# Patient Record
Sex: FEMALE | Employment: UNEMPLOYED | URBAN - METROPOLITAN AREA
[De-identification: names, ages, dates, MRNs, and addresses within clinical notes are randomized per-mention and may not be internally consistent; named-entity substitution may affect disease eponyms.]

---

## 2019-11-04 ENCOUNTER — OFFICE VISIT (OUTPATIENT)
Dept: OTOLARYNGOLOGY | Facility: CLINIC | Age: 49
End: 2019-11-04
Payer: MEDICAID

## 2019-11-04 VITALS
BODY MASS INDEX: 41.02 KG/M2 | TEMPERATURE: 99 F | SYSTOLIC BLOOD PRESSURE: 116 MMHG | HEIGHT: 71 IN | DIASTOLIC BLOOD PRESSURE: 76 MMHG | WEIGHT: 293 LBS

## 2019-11-04 DIAGNOSIS — R05.9 COUGH: ICD-10-CM

## 2019-11-04 DIAGNOSIS — E04.1 THYROID NODULE: Primary | ICD-10-CM

## 2019-11-04 PROCEDURE — 99243 OFF/OP CNSLTJ NEW/EST LOW 30: CPT | Performed by: OTOLARYNGOLOGY

## 2019-11-04 RX ORDER — NAPROXEN 500 MG/1
TABLET ORAL
Refills: 1 | COMMUNITY
Start: 2019-07-05

## 2019-11-04 RX ORDER — LIDOCAINE HYDROCHLORIDE 20 MG/ML
SOLUTION ORAL; TOPICAL
Refills: 0 | COMMUNITY
Start: 2019-05-28

## 2019-11-04 RX ORDER — MAGNESIUM OXIDE 400 MG (241.3 MG MAGNESIUM) TABLET
1 TABLET NIGHTLY
COMMUNITY

## 2019-11-04 RX ORDER — ALBUTEROL SULFATE 90 UG/1
AEROSOL, METERED RESPIRATORY (INHALATION)
Refills: 3 | COMMUNITY
Start: 2019-10-14

## 2019-11-04 RX ORDER — MONTELUKAST SODIUM 10 MG/1
10 TABLET ORAL NIGHTLY
Qty: 30 TABLET | Refills: 3 | Status: SHIPPED | OUTPATIENT
Start: 2019-11-04

## 2019-11-04 RX ORDER — FLUTICASONE PROPIONATE 50 MCG
SPRAY, SUSPENSION (ML) NASAL
Refills: 6 | COMMUNITY
Start: 2019-10-14

## 2019-11-04 NOTE — PROGRESS NOTES
Ben Calvillo is a 54year old female. Patient presents with:  Thyroid Nodule: US done on 10-29-19 showed thyroid nodule      HISTORY OF PRESENT ILLNESS  She presents a long history of cough.   She had a chest x-ray performed due to this cough which de Past Medical History:   Diagnosis Date   • Anemia    • Osteoarthritis        Past Surgical History:   Procedure Laterality Date   • HIP SURGERY Left    • OTHER SURGICAL HISTORY      turbinate reduction          REVIEW OF SYSTEMS    System Neg/Pos Det Supraclavicular.         Nose/Mouth/Throat Normal External nose - Normal. Lips/teeth/gums - Normal. Tonsils - Normal. Oropharynx - Normal.   Nose/Mouth/Throat Normal Nares - Right: Normal Left: Normal. Septum -deviated to the left turbinates - Right: Normal slightly. I have asked her to use fluticasone twice a day Loratadine-D and Singulair and we will reevaluate her in a few weeks with laryngoscopy if she is no better.   We will get appropriate authorization from insurance to perform this in the office when

## 2019-11-25 NOTE — IMAGING NOTE
Left voice message for the patient, regarding thyroid biopsy, with arrival time,parking, LOS and also to hold all the blood thinners, including anti-inflammatory agents, including naproxen. Left call back number.

## 2019-11-29 ENCOUNTER — HOSPITAL ENCOUNTER (OUTPATIENT)
Dept: ULTRASOUND IMAGING | Facility: HOSPITAL | Age: 49
Discharge: HOME | End: 2019-11-29
Attending: OTOLARYNGOLOGY
Payer: MEDICAID

## 2019-11-29 VITALS
DIASTOLIC BLOOD PRESSURE: 67 MMHG | WEIGHT: 293 LBS | SYSTOLIC BLOOD PRESSURE: 118 MMHG | BODY MASS INDEX: 41.02 KG/M2 | RESPIRATION RATE: 15 BRPM | HEART RATE: 65 BPM | HEIGHT: 71 IN

## 2019-11-29 DIAGNOSIS — E04.1 THYROID NODULE: ICD-10-CM

## 2019-11-29 PROCEDURE — 88177 CYTP FNA EVAL EA ADDL: CPT | Performed by: OTOLARYNGOLOGY

## 2019-11-29 PROCEDURE — 88172 CYTP DX EVAL FNA 1ST EA SITE: CPT | Performed by: OTOLARYNGOLOGY

## 2019-11-29 PROCEDURE — 88173 CYTOPATH EVAL FNA REPORT: CPT | Performed by: OTOLARYNGOLOGY

## 2019-11-29 PROCEDURE — 10005 FNA BX W/US GDN 1ST LES: CPT | Performed by: OTOLARYNGOLOGY

## 2019-11-29 NOTE — IMAGING NOTE
1418 Pt arrived to ultrasound room #4     1425 Scans taken by Chas Johnson, ultrasound  sonographer     1426 History taken and as follows:  ABNORMAL US THYROID. FAMILY HX THYROID DISEASE.      1428 Procedure explained questions answered.     1429 Consent verifie

## 2019-12-04 ENCOUNTER — TELEPHONE (OUTPATIENT)
Dept: OTOLARYNGOLOGY | Facility: CLINIC | Age: 49
End: 2019-12-04

## 2019-12-04 NOTE — TELEPHONE ENCOUNTER
Reviewed Dr. Umm Andino comments in result note section with pt. FU appt booked for 12/6 in [facility].

## 2019-12-06 ENCOUNTER — OFFICE VISIT (OUTPATIENT)
Dept: OTOLARYNGOLOGY | Facility: CLINIC | Age: 49
End: 2019-12-06
Payer: MEDICAID

## 2019-12-06 ENCOUNTER — TELEPHONE (OUTPATIENT)
Dept: OTOLARYNGOLOGY | Facility: CLINIC | Age: 49
End: 2019-12-06

## 2019-12-06 VITALS
SYSTOLIC BLOOD PRESSURE: 130 MMHG | HEIGHT: 71 IN | DIASTOLIC BLOOD PRESSURE: 76 MMHG | TEMPERATURE: 98 F | WEIGHT: 293 LBS | BODY MASS INDEX: 41.02 KG/M2

## 2019-12-06 DIAGNOSIS — R07.0 THROAT PAIN IN ADULT: ICD-10-CM

## 2019-12-06 DIAGNOSIS — E04.1 THYROID NODULE: Primary | ICD-10-CM

## 2019-12-06 PROCEDURE — 99214 OFFICE O/P EST MOD 30 MIN: CPT | Performed by: OTOLARYNGOLOGY

## 2019-12-07 NOTE — PROGRESS NOTES
Prasad Andrews is a 54year old female. Patient presents with:  Results: US FNA right thyroid nodule done on 11-29-19      HISTORY OF PRESENT ILLNESS  She presents a long history of cough.   She had a chest x-ray performed due to this cough which type of malignant process in her throat. She denies reflux but does admit to some postnasal discharge and nasal congestion. No other signs, symptoms or complaints with no associated voice changes ear pain or difficulty swallowing food.       Social Histor Normal. Thyroid gland - Normal.   Eyes Normal Conjunctiva - Right: Normal, Left: Normal. Pupil - Right: Normal, Left: Normal. Fundus - Right: Normal, Left: Normal.   Neurological Normal Memory - Normal. Cranial nerves - Cranial nerves II through XII grossl centimeter in size. 2. Throat pain in adult  Throat pain may be secondary to chronic postnasal discharge. I have asked her to continue fluticasone and to start Loratadine-D and Singulair.   Return to see me in 1 month and will perform laryngoscopy in th

## 2019-12-09 NOTE — TELEPHONE ENCOUNTER
Rn tried to contact Nicolas Lyle from [facility] ext [phone] to inform her that we will get the prior auth  1-2 weeks prior to pt appointment on 1/10/2020.

## 2019-12-20 ENCOUNTER — TELEPHONE (OUTPATIENT)
Dept: OTOLARYNGOLOGY | Facility: CLINIC | Age: 49
End: 2019-12-20

## 2019-12-20 NOTE — TELEPHONE ENCOUNTER
Rn tried to get prior auth for laryngoscopy spoke to [employer] ref#[id number] an 12/21/19 and was advised to call back 7 days prior to appointment date.

## 2019-12-30 ENCOUNTER — TELEPHONE (OUTPATIENT)
Dept: OTOLARYNGOLOGY | Facility: CLINIC | Age: 49
End: 2019-12-30

## 2019-12-30 NOTE — TELEPHONE ENCOUNTER
Prior auth for laryngoscope will be done in the office still in process thru [employer]ove[employer] case #[id number] and clinicals were faxed to #[phone].

## 2020-01-02 NOTE — TELEPHONE ENCOUNTER
LMTCB to inform pt prior authorization for laryngoscopy  A [id number] 1 visit valid from 12/30/19 thru 2/9/20 as pt had scheduled appt on 1/10/20 ,please advise pt pt to check insurance for benefits and out of pocket costs.

## 2020-01-10 ENCOUNTER — OFFICE VISIT (OUTPATIENT)
Dept: OTOLARYNGOLOGY | Facility: CLINIC | Age: 50
End: 2020-01-10
Payer: MEDICAID

## 2020-01-10 VITALS
HEIGHT: 71 IN | DIASTOLIC BLOOD PRESSURE: 82 MMHG | TEMPERATURE: 98 F | SYSTOLIC BLOOD PRESSURE: 132 MMHG | WEIGHT: 293 LBS | BODY MASS INDEX: 41.02 KG/M2

## 2020-01-10 DIAGNOSIS — R07.0 THROAT PAIN IN ADULT: Primary | ICD-10-CM

## 2020-01-10 PROCEDURE — 99213 OFFICE O/P EST LOW 20 MIN: CPT | Performed by: OTOLARYNGOLOGY

## 2020-01-10 RX ORDER — METHSCOPOLAMINE BROMIDE 2.5 MG/1
2.5 TABLET ORAL 2 TIMES DAILY
Qty: 60 TABLET | Refills: 3 | Status: SHIPPED | OUTPATIENT
Start: 2020-01-10

## 2020-01-10 RX ORDER — AMOXICILLIN AND CLAVULANATE POTASSIUM 875; 125 MG/1; MG/1
1 TABLET, FILM COATED ORAL EVERY 12 HOURS
Qty: 20 TABLET | Refills: 0 | Status: SHIPPED | OUTPATIENT
Start: 2020-01-10

## 2020-01-10 NOTE — PROGRESS NOTES
Jeancarlos Marquez is a 54year old female. Patient presents with:   Follow - Up: regarding throat pain, no change in symptoms since last visit       HISTORY OF PRESENT ILLNESS  She presents a long history of cough.  She had a chest x-ray performed d that she may have some type of malignant process in her throat. She denies reflux but does admit to some postnasal discharge and nasal congestion.   No other signs, symptoms or complaints with no associated voice changes ear pain or difficulty swallowing f intolerance and heat intolerance. Neuro Negative Tremors. Psych Negative Anxiety and depression. Integumentary Negative Frequent skin infections, pigment change and rash. Hema/Lymph Negative Easy bleeding and easy bruising.            PHYSICAL EXAM into the nose. Laryngoscopy:  Flexible Fiberoptic Laryngoscopy: A diagnostic flexible fiberoptic laryngoscopy was performed. The flexible fiberoptic laryngoscope was placed into the nose or mouthand advanced  into the interior of the larynx.  A thorough the Singulair loratadine D and Astelin nasal spray and will add Augmentin for what appears to be an acute sinus infection and methscopolamine for her chronic drainage. Return to see me in 1 month for further evaluation.   Endoscopy reveals no lesions or ma

## 2020-01-14 ENCOUNTER — TELEPHONE (OUTPATIENT)
Dept: OTOLARYNGOLOGY | Facility: CLINIC | Age: 50
End: 2020-01-14

## 2020-01-14 NOTE — TELEPHONE ENCOUNTER
•  Methscopolamine Bromide 2.5 MG Oral Tab, Take 1 tablet (2.5 mg total) by mouth 2 (two) times daily. , Disp: 60 tablet, Rfl: 3    Fax received from [employer] plan does not cover medication prescribed/ please fax back with margaat

## 2020-01-15 NOTE — TELEPHONE ENCOUNTER
Medication PA Requested:  Methscopolamine Bromide 2.5 mg twice daily                                                           Insurance ID# and group:[id number]  Dx.:throat pain  Rn faxed prior auth form To [employer] #[phone],RG will 24 ho

## 2020-01-23 NOTE — TELEPHONE ENCOUNTER
Dr Pedraza Blazing patient prior auth for Methscopolamine was denied and advised pt to try glycopyrrolate,please advise.

## 2020-01-24 RX ORDER — HYOSCYAMINE SULFATE 0.125 MG
125 TABLET ORAL 2 TIMES DAILY
Qty: 60 TABLET | Refills: 1 | OUTPATIENT
Start: 2020-01-24

## 2024-04-16 ENCOUNTER — OFFICE VISIT (OUTPATIENT)
Dept: SURGERY | Facility: CLINIC | Age: 54
End: 2024-04-16
Payer: MEDICAID

## 2024-04-16 DIAGNOSIS — R59.1 LYMPHADENOPATHY: Primary | ICD-10-CM

## 2024-04-16 PROCEDURE — 99203 OFFICE O/P NEW LOW 30 MIN: CPT | Performed by: SURGERY

## 2024-04-16 NOTE — H&P
History and Physical      HPI       HPI  Megan Almonte is a 53 year old female who presents with painful lymphadenopathy.  CT scan demonstrates intra-abdominal as well as axillary lymphadenopathy.  These are tender to palpation.  She is sent for excisional biopsy.  She is also undergoing a neurologic workup at [facility] for sciatica and upper extremity weakness    Past Medical History:    Anemia    Osteoarthritis     Past Surgical History:   Procedure Laterality Date    Hip surgery Left     Other surgical history      turbinate reduction      Current Outpatient Medications   Medication Sig Dispense Refill    Hyoscyamine Sulfate 0.125 MG Oral Tab Take 1 tablet (125 mcg total) by mouth 2 (two) times daily. 60 tablet 1    Sertraline HCl 50 MG Oral Tab TK 1 T PO QD      Methscopolamine Bromide 2.5 MG Oral Tab Take 1 tablet (2.5 mg total) by mouth 2 (two) times daily. 60 tablet 3    Amoxicillin-Pot Clavulanate 875-125 MG Oral Tab Take 1 tablet by mouth every 12 (twelve) hours. 20 tablet 0    Albuterol Sulfate  (90 Base) MCG/ACT Inhalation Aero Soln INL 2 PFS PO Q 4 H  3    FEROSUL 325 (65 Fe) MG Oral Tab TK 1 T PO BID  0    Fluticasone Propionate 50 MCG/ACT Nasal Suspension SHAKE LQ AND U 1 SPR IEN QD  6    naproxen 500 MG Oral Tab TK 1 T PO BID  1    melatonin 1 MG Oral Tab Take 1 mg by mouth nightly.      Montelukast Sodium 10 MG Oral Tab Take 1 tablet (10 mg total) by mouth nightly. 30 tablet 3    Loratadine-Pseudoephedrine ER 5-120 MG Oral Tablet 12 Hr Take 1 tablet by mouth every 12 (twelve) hours. 60 tablet 3     ALLERGIES  Allergies   Allergen Reactions    Codeine UNKNOWN       Social History     Socioeconomic History    Marital status: Single   Tobacco Use    Smoking status: Never    Smokeless tobacco: Never   Vaping Use    Vaping status: Never Used   Substance and Sexual Activity    Alcohol use: Never    Drug use: Never     Family History   Problem Relation Age of Onset    Diabetes  Mother     Hypertension Mother        Review of Systems   A comprehensive 10 point review of systems was completed.  Pertinent positives and negatives noted in the the HPI.    PHYSICAL EXAM   There were no vitals taken for this visit. No LMP recorded.   Constitutional: appears well hydrated alert and responsive no acute distress noted  Head/Face: normocephalic  Nose/Mouth/Throat: nose and throat are clear palate is intact mucous membranes are moist no oral lesions are noted  Neck/Thyroid: neck is supple without adenopathy  Respiratory: normal to inspection lungs are clear to auscultation bilaterally normal respiratory effort    Right axilla with enlarged painful lymph node    Cardiovascular: regular rate and rhythm no murmurs, gallups, or rubs  Abdomen: soft non-tender non-distended no organomegaly noted no masses  Extremities: no edema, cyanosis, or clubbing  Neurological: exam appropriate for age reflexes and motor skills appropriate for age      ASSESSMENT/PLAN   Assessment   Encounter Diagnosis   Name Primary?    Lymphadenopathy Yes       53 year old female with axillary lymphadenopathy  We have discussed the surgical risks, benefits, alternatives, and expected recovery. We will plan right axillary lymph node biopsy at [facility]. All of the patient's questions have been answered to her satisfaction.       4/16/2024  Adrian San MD

## 2024-04-16 NOTE — H&P (VIEW-ONLY)
History and Physical      HPI       HPI  Megan Almonte is a 53 year old female who presents with painful lymphadenopathy.  CT scan demonstrates intra-abdominal as well as axillary lymphadenopathy.  These are tender to palpation.  She is sent for excisional biopsy.  She is also undergoing a neurologic workup at [facility] for sciatica and upper extremity weakness    Past Medical History:    Anemia    Osteoarthritis     Past Surgical History:   Procedure Laterality Date    Hip surgery Left     Other surgical history      turbinate reduction      Current Outpatient Medications   Medication Sig Dispense Refill    Hyoscyamine Sulfate 0.125 MG Oral Tab Take 1 tablet (125 mcg total) by mouth 2 (two) times daily. 60 tablet 1    Sertraline HCl 50 MG Oral Tab TK 1 T PO QD      Methscopolamine Bromide 2.5 MG Oral Tab Take 1 tablet (2.5 mg total) by mouth 2 (two) times daily. 60 tablet 3    Amoxicillin-Pot Clavulanate 875-125 MG Oral Tab Take 1 tablet by mouth every 12 (twelve) hours. 20 tablet 0    Albuterol Sulfate  (90 Base) MCG/ACT Inhalation Aero Soln INL 2 PFS PO Q 4 H  3    FEROSUL 325 (65 Fe) MG Oral Tab TK 1 T PO BID  0    Fluticasone Propionate 50 MCG/ACT Nasal Suspension SHAKE LQ AND U 1 SPR IEN QD  6    naproxen 500 MG Oral Tab TK 1 T PO BID  1    melatonin 1 MG Oral Tab Take 1 mg by mouth nightly.      Montelukast Sodium 10 MG Oral Tab Take 1 tablet (10 mg total) by mouth nightly. 30 tablet 3    Loratadine-Pseudoephedrine ER 5-120 MG Oral Tablet 12 Hr Take 1 tablet by mouth every 12 (twelve) hours. 60 tablet 3     ALLERGIES  Allergies   Allergen Reactions    Codeine UNKNOWN       Social History     Socioeconomic History    Marital status: Single   Tobacco Use    Smoking status: Never    Smokeless tobacco: Never   Vaping Use    Vaping status: Never Used   Substance and Sexual Activity    Alcohol use: Never    Drug use: Never     Family History   Problem Relation Age of Onset    Diabetes  Mother     Hypertension Mother        Review of Systems   A comprehensive 10 point review of systems was completed.  Pertinent positives and negatives noted in the the HPI.    PHYSICAL EXAM   There were no vitals taken for this visit. No LMP recorded.   Constitutional: appears well hydrated alert and responsive no acute distress noted  Head/Face: normocephalic  Nose/Mouth/Throat: nose and throat are clear palate is intact mucous membranes are moist no oral lesions are noted  Neck/Thyroid: neck is supple without adenopathy  Respiratory: normal to inspection lungs are clear to auscultation bilaterally normal respiratory effort    Right axilla with enlarged painful lymph node    Cardiovascular: regular rate and rhythm no murmurs, gallups, or rubs  Abdomen: soft non-tender non-distended no organomegaly noted no masses  Extremities: no edema, cyanosis, or clubbing  Neurological: exam appropriate for age reflexes and motor skills appropriate for age      ASSESSMENT/PLAN   Assessment   Encounter Diagnosis   Name Primary?    Lymphadenopathy Yes       53 year old female with axillary lymphadenopathy  We have discussed the surgical risks, benefits, alternatives, and expected recovery. We will plan right axillary lymph node biopsy at [facility]. All of the patient's questions have been answered to her satisfaction.       4/16/2024  Adrian San MD           No fx/dislocation

## 2024-04-22 ENCOUNTER — OFFICE VISIT (OUTPATIENT)
Dept: OTOLARYNGOLOGY | Facility: CLINIC | Age: 54
End: 2024-04-22

## 2024-04-22 VITALS — HEIGHT: 71 IN | WEIGHT: 293 LBS | BODY MASS INDEX: 41.02 KG/M2

## 2024-04-22 DIAGNOSIS — E04.1 THYROID NODULE: Primary | ICD-10-CM

## 2024-04-22 PROCEDURE — 99203 OFFICE O/P NEW LOW 30 MIN: CPT | Performed by: OTOLARYNGOLOGY

## 2024-04-22 NOTE — PROGRESS NOTES
Megan Almonte is a 53 year old female.    Chief Complaint   Patient presents with    Thyroid Nodule     Patient had recent ultrasound last month,       HISTORY OF PRESENT ILLNESS  She presents a long history of cough.  She had a chest x-ray performed due to this cough which demonstrated a deviated trachea to the right as well as thyroid nodularity.  She underwent a subsequent ultrasound which demonstrated multinodular goiter with initial reading of 0.74 cm nodule on the right with recommendation of repeat ultrasound in several months.  On the addendum was made saying that this nodule appeared suspicious due to its lack of unclear margins and a recommendation for needle biopsy was made by the radiologist.  She is here for further evaluation and treatment.  She does complain of her chronic cough and states that she feels that there is something caught in the back of her throat neck she has tenderness and discomfort when she touches her mid neck at the level of the larynx.  Using fluticasone on a as needed basis but none recently and has been using albuterol for presumed bronchial origin of her cough.  Originally told that she had bronchitis with chest x-ray to rule out any pulmonary issues.  Allergies?  She does note that she is having difficulty breathing through nose.  She has a previous history of turbinate reduction many years ago but states that she is been told that her turbinates are enlarged again.  Unclear if she has a deviated septum.     12/6/19 she is here to go over the results of her needle biopsy.  Previous ultrasound demonstrated a subcentimeter nodule which was read as being suspicious.  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  She is here to discuss further management regarding this finding.  In addition she does complain of sore throat sensation of something being caught in the back of her throat at all times.   Worried that she may have some type of malignant process in her throat.  She denies reflux but does admit to some postnasal discharge and nasal congestion.  No other signs, symptoms or complaints with no associated voice changes ear pain or difficulty swallowing food.     1/10/20 last visit she was started on Singulair Loratadine-D as well as Astelin nasal spray and now is having thicker nasal mucus.  She did have some improvement in her symptoms initially but now things have worsened significantly over the past week or 2.  Acute sinus infection?  She feels like there is something stuck in the back of her throat at all times which is very uncomfortable primarily on the left side.  She does have a history of an enlarged thyroid.  No tobacco use.  Some mucopurulence noted through her nose and mouth recently.  No other signs, symptoms or complaints     4/22/24 I last saw her 4 years ago for thyroid nodules.  Had a 0.74 cm nodule on the right which was biopsied and shown to be a follicular nodule.  Now presents with a recent ultrasound performed at [facility] demonstrating multiple nodules bilaterally with the largest being 1.3 cm on the left and rated either a TR 2 or 3.  The study does not specify what T RADS level of this nodule was but no indication for biopsy at this time.  Asymptomatic.  Little concerned because she had an aunt who underwent surgery for some type of thyroid problem had it removed but they are not sure if she had cancer because she has not told anybody as of yet.  No other signs, symptoms or complaints.  Sent by Dr. Chang for my opinion regarding her thyroid issues.                Social History     Socioeconomic History    Marital status: Single   Tobacco Use    Smoking status: Never    Smokeless tobacco: Never   Vaping Use    Vaping status: Never Used   Substance and Sexual Activity    Alcohol use: Never    Drug use: Never       Family History   Problem Relation Age of Onset     Diabetes Mother     Hypertension Mother        Past Medical History:    Anemia    Osteoarthritis       Past Surgical History:   Procedure Laterality Date    Hip surgery Left     Other surgical history      turbinate reduction          REVIEW OF SYSTEMS    System Neg/Pos Details   Constitutional Negative Fatigue, fever and weight loss.   ENMT Negative Drooling.   Eyes Negative Blurred vision and vision changes.   Respiratory Negative Dyspnea and wheezing.   Cardio Negative Chest pain, irregular heartbeat/palpitations and syncope.   GI Negative Abdominal pain and diarrhea.   Endocrine Negative Cold intolerance and heat intolerance.   Neuro Negative Tremors.   Psych Negative Anxiety and depression.   Integumentary Negative Frequent skin infections, pigment change and rash.   Hema/Lymph Negative Easy bleeding and easy bruising.           PHYSICAL EXAM    Ht 5' 11\" (1.803 m)   Wt (!) 320 lb (145.2 kg)   BMI 44.63 kg/m²        Constitutional Normal Overall appearance - Normal.   Psychiatric Normal Orientation - Oriented to time, place, person & situation. Appropriate mood and affect.   Neck Exam Normal Inspection - Normal. Palpation - Normal. Parotid gland - Normal. Thyroid gland - Normal.   Eyes Normal Conjunctiva - Right: Normal, Left: Normal. Pupil - Right: Normal, Left: Normal. Fundus - Right: Normal, Left: Normal.   Neurological Normal Memory - Normal. Cranial nerves - Cranial nerves II through XII grossly intact.   Head/Face Normal Facial features - Normal. Eyebrows - Normal. Skull - Normal.        Nasopharynx Normal External nose - Normal. Lips/teeth/gums - Normal. Tonsils - Normal. Oropharynx - Normal.   Ears Normal Inspection - Right: Normal, Left: Normal. Canal - Right: Normal, Left: Normal. TM - Right: Normal, Left: Normal.   Skin Normal Inspection - Normal.        Lymph Detail Normal Submental. Submandibular. Anterior cervical. Posterior cervical. Supraclavicular.        Nose/Mouth/Throat Normal External  nose - Normal. Lips/teeth/gums - Normal. Tonsils - Normal. Oropharynx - Normal.   Nose/Mouth/Throat Normal Nares - Right: Normal Left: Normal. Septum -Normal  Turbinates - Right: Normal, Left: Normal.       Current Outpatient Medications:     Hyoscyamine Sulfate 0.125 MG Oral Tab, Take 1 tablet (125 mcg total) by mouth 2 (two) times daily., Disp: 60 tablet, Rfl: 1    Sertraline HCl 50 MG Oral Tab, TK 1 T PO QD, Disp: , Rfl:     Methscopolamine Bromide 2.5 MG Oral Tab, Take 1 tablet (2.5 mg total) by mouth 2 (two) times daily., Disp: 60 tablet, Rfl: 3    Amoxicillin-Pot Clavulanate 875-125 MG Oral Tab, Take 1 tablet by mouth every 12 (twelve) hours., Disp: 20 tablet, Rfl: 0    Albuterol Sulfate  (90 Base) MCG/ACT Inhalation Aero Soln, INL 2 PFS PO Q 4 H, Disp: , Rfl: 3    FEROSUL 325 (65 Fe) MG Oral Tab, TK 1 T PO BID, Disp: , Rfl: 0    Fluticasone Propionate 50 MCG/ACT Nasal Suspension, SHAKE LQ AND U 1 SPR IEN QD, Disp: , Rfl: 6    naproxen 500 MG Oral Tab, TK 1 T PO BID, Disp: , Rfl: 1    melatonin 1 MG Oral Tab, Take 1 tablet (1 mg total) by mouth nightly., Disp: , Rfl:     Montelukast Sodium 10 MG Oral Tab, Take 1 tablet (10 mg total) by mouth nightly., Disp: 30 tablet, Rfl: 3    Loratadine-Pseudoephedrine ER 5-120 MG Oral Tablet 12 Hr, Take 1 tablet by mouth every 12 (twelve) hours., Disp: 60 tablet, Rfl: 3  ASSESSMENT AND PLAN    1. Thyroid nodule  Unchanged right-sided nodule.  Previous biopsy demonstrated follicular nodule.  Now with a 1.3 cm either TR 2 or TR 3 lesion.  Ultrasound result does not state what T RADS score and has.  I did recommend repeating an ultrasound in 1 year and at this time no indication for biopsy.        This note was prepared using Dragon [employer] voice recognition dictation software. As a result errors may occur. When identified these errors have been corrected. While every attempt is made to correct errors during dictation discrepancies may still exist    Osbaldo Lance  MD    4/22/2024    5:25 PM

## 2024-05-11 RX ORDER — DULAGLUTIDE 1.5 MG/.5ML
INJECTION, SOLUTION SUBCUTANEOUS
COMMUNITY
Start: 2023-12-03

## 2024-05-14 ENCOUNTER — TELEPHONE (OUTPATIENT)
Dept: SURGERY | Facility: CLINIC | Age: 54
End: 2024-05-14

## 2024-05-15 ENCOUNTER — LAB ENCOUNTER (OUTPATIENT)
Dept: LAB | Facility: HOSPITAL | Age: 54
End: 2024-05-15
Attending: SURGERY

## 2024-05-15 ENCOUNTER — ANESTHESIA EVENT (OUTPATIENT)
Dept: SURGERY | Facility: HOSPITAL | Age: 54
End: 2024-05-15

## 2024-05-15 DIAGNOSIS — R59.1 LYMPHADENOPATHY: Primary | ICD-10-CM

## 2024-05-15 DIAGNOSIS — R59.1 LYMPHADENOPATHY: ICD-10-CM

## 2024-05-15 LAB
ALBUMIN SERPL-MCNC: 4.1 G/DL (ref 3.2–4.8)
ALBUMIN/GLOB SERPL: 1.2 {RATIO} (ref 1–2)
ALP LIVER SERPL-CCNC: 69 U/L
ALT SERPL-CCNC: 20 U/L
ANION GAP SERPL CALC-SCNC: 9 MMOL/L (ref 0–18)
AST SERPL-CCNC: 14 U/L (ref ?–34)
B-HCG UR QL: NEGATIVE
BASOPHILS # BLD AUTO: 0.07 X10(3) UL (ref 0–0.2)
BASOPHILS NFR BLD AUTO: 0.6 %
BILIRUB SERPL-MCNC: 0.2 MG/DL (ref 0.3–1.2)
BUN BLD-MCNC: 9 MG/DL (ref 9–23)
BUN/CREAT SERPL: 8.7 (ref 10–20)
CALCIUM BLD-MCNC: 9.5 MG/DL (ref 8.7–10.4)
CHLORIDE SERPL-SCNC: 106 MMOL/L (ref 98–112)
CO2 SERPL-SCNC: 24 MMOL/L (ref 21–32)
CREAT BLD-MCNC: 1.03 MG/DL
DEPRECATED RDW RBC AUTO: 47.1 FL (ref 35.1–46.3)
EGFRCR SERPLBLD CKD-EPI 2021: 66 ML/MIN/1.73M2 (ref 60–?)
EOSINOPHIL # BLD AUTO: 0.66 X10(3) UL (ref 0–0.7)
EOSINOPHIL NFR BLD AUTO: 5.7 %
ERYTHROCYTE [DISTWIDTH] IN BLOOD BY AUTOMATED COUNT: 15.6 % (ref 11–15)
FASTING STATUS PATIENT QL REPORTED: NO
GLOBULIN PLAS-MCNC: 3.3 G/DL (ref 2–3.5)
GLUCOSE BLD-MCNC: 108 MG/DL (ref 70–99)
HCT VFR BLD AUTO: 42.6 %
HGB BLD-MCNC: 13.7 G/DL
IMM GRANULOCYTES # BLD AUTO: 0.05 X10(3) UL (ref 0–1)
IMM GRANULOCYTES NFR BLD: 0.4 %
LYMPHOCYTES # BLD AUTO: 2.22 X10(3) UL (ref 1–4)
LYMPHOCYTES NFR BLD AUTO: 19.3 %
MCH RBC QN AUTO: 26.5 PG (ref 26–34)
MCHC RBC AUTO-ENTMCNC: 32.2 G/DL (ref 31–37)
MCV RBC AUTO: 82.4 FL
MONOCYTES # BLD AUTO: 0.77 X10(3) UL (ref 0.1–1)
MONOCYTES NFR BLD AUTO: 6.7 %
NEUTROPHILS # BLD AUTO: 7.74 X10 (3) UL (ref 1.5–7.7)
NEUTROPHILS # BLD AUTO: 7.74 X10(3) UL (ref 1.5–7.7)
NEUTROPHILS NFR BLD AUTO: 67.3 %
OSMOLALITY SERPL CALC.SUM OF ELEC: 287 MOSM/KG (ref 275–295)
PLATELET # BLD AUTO: 428 10(3)UL (ref 150–450)
POTASSIUM SERPL-SCNC: 3.7 MMOL/L (ref 3.5–5.1)
PROT SERPL-MCNC: 7.4 G/DL (ref 5.7–8.2)
RBC # BLD AUTO: 5.17 X10(6)UL
SODIUM SERPL-SCNC: 139 MMOL/L (ref 136–145)
WBC # BLD AUTO: 11.5 X10(3) UL (ref 4–11)

## 2024-05-15 PROCEDURE — 93010 ELECTROCARDIOGRAM REPORT: CPT | Performed by: INTERNAL MEDICINE

## 2024-05-15 PROCEDURE — 93005 ELECTROCARDIOGRAM TRACING: CPT

## 2024-05-15 PROCEDURE — 80053 COMPREHEN METABOLIC PANEL: CPT

## 2024-05-15 PROCEDURE — 85025 COMPLETE CBC W/AUTO DIFF WBC: CPT

## 2024-05-15 PROCEDURE — 36415 COLL VENOUS BLD VENIPUNCTURE: CPT

## 2024-05-15 PROCEDURE — 81025 URINE PREGNANCY TEST: CPT

## 2024-05-15 RX ORDER — DICLOFENAC SODIUM 75 MG/1
1 TABLET, DELAYED RELEASE ORAL 2 TIMES DAILY
COMMUNITY
Start: 2022-10-20 | End: 2024-05-16

## 2024-05-15 RX ORDER — PREGABALIN 75 MG/1
75 CAPSULE ORAL AS NEEDED
COMMUNITY
Start: 2024-02-08 | End: 2024-08-06

## 2024-05-15 RX ORDER — TRAMADOL HYDROCHLORIDE 50 MG/1
50 TABLET ORAL AS NEEDED
COMMUNITY
End: 2024-05-16

## 2024-05-15 RX ORDER — BACLOFEN 10 MG/1
10 TABLET ORAL
COMMUNITY
Start: 2022-12-20 | End: 2024-05-16

## 2024-05-15 RX ORDER — METFORMIN HYDROCHLORIDE 500 MG/1
500 TABLET, EXTENDED RELEASE ORAL
COMMUNITY
Start: 2024-02-12

## 2024-05-15 RX ORDER — GABAPENTIN 600 MG/1
600 TABLET ORAL AS NEEDED
COMMUNITY
Start: 2024-03-05

## 2024-05-15 RX ORDER — DULOXETIN HYDROCHLORIDE 60 MG/1
CAPSULE, DELAYED RELEASE ORAL DAILY
COMMUNITY
Start: 2022-10-20

## 2024-05-15 NOTE — DISCHARGE INSTRUCTIONS
DISCHARGE INSTRUCTIONS  Ice pack as needed.  May shower in 24 hours.  Ibuprofen 600 mg every 6 hours for pain, tramadol if needed  Avoid heavy lifting with the right arm for 2 weeks 10 pounds  Follow-up 2 weeks for wound check     HOME INSTRUCTIONS  [facility] HOME CARE INSTRUCTIONS: POST-OP ANESTHESIA  The medication that you received for sedation or general anesthesia can last up to 24 hours. Your judgment and reflexes may be altered, even if you feel like your normal self.      We Recommend:   Do not drive any motor vehicle or bicycle   Avoid mowing the lawn, playing sports, or working with power tools/applicances (power saws, electric knives or mixers)   That you have someone stay with you on your first night home   Do not drink alcohol or take sleeping pills or tranquilizers   Do not sign legal documents within 24 hours of your procedure   If you had a nerve block for your surgery, take extra care not to put any pressure on your arm or hand for 24 hours    It is normal:  For you to have a sore throat if you had a breathing tube during surgery (while you were asleep!). The sore throat should get better within 48 hours. You can gargle with warm salt water (1/2 tsp in 4 oz warm water) or use a throat lozenge for comfort  To feel muscle aches or soreness especially in the abdomen, chest or neck. The achy feeling should go away in the next 24 hours  To feel weak, sleepy or \"wiped out\". Your should start feeling better in the next 24 hours.   To experience mild discomforts such as sore lip or tongue, headache, cramps, gas pains or a bloated feeling in your abdomen.   To experience mild back pain or soreness for a day or two if you had spinal or epidural anesthesia.   If you had laparoscopic surgery, to feel shoulder pain or discomfort on the day of surgery.   For some patients to have nausea after surgery/anesthesia    If you feel nausea or experience vomiting:   Try to move around less.   Eat less than usual or drink  only liquids until the next morning   Nausea should resolve in about 24 hours    If you have a problem when you are at home:    Call your surgeons office   Discharge Instructions: After Your Surgery  You’ve just had surgery. During surgery, you were given medicine called anesthesia to keep you relaxed and free of pain. After surgery, you may have some pain or nausea. This is common. Here are some tips for feeling better and getting well after surgery.   Going home  Your healthcare provider will show you how to take care of yourself when you go home. They'll also answer your questions. Have an adult family member or friend drive you home. For the first 24 hours after your surgery:   Don't drive or use heavy equipment.  Don't make important decisions or sign legal papers.  Take medicines as directed.  Don't drink alcohol.  Have someone stay with you, if needed. They can watch for problems and help keep you safe.  Be sure to go to all follow-up visits with your healthcare provider. And rest after your surgery for as long as your provider tells you to.   Coping with pain  If you have pain after surgery, pain medicine will help you feel better. Take it as directed, before pain becomes severe. Also, ask your healthcare provider or pharmacist about other ways to control pain. This might be with heat, ice, or relaxation. And follow any other instructions your surgeon or nurse gives you.      Stay on schedule with your medicine.     Tips for taking pain medicine  To get the best relief possible, remember these points:   Pain medicines can upset your stomach. Taking them with a little food may help.  Most pain relievers taken by mouth need at least 20 to 30 minutes to start to work.  Don't wait till your pain becomes severe before you take your medicine. Try to time your medicine so that you can take it before starting an activity. This might be before you get dressed, go for a walk, or sit down for dinner.  Constipation is a  common side effect of some pain medicines. Call your healthcare provider before taking any medicines such as laxatives or stool softeners to help ease constipation. Also ask if you should skip any foods. Drinking lots of fluids and eating foods such as fruits and vegetables that are high in fiber can also help. Remember, don't take laxatives unless your surgeon has prescribed them.  Drinking alcohol and taking pain medicine can cause dizziness and slow your breathing. It can even be deadly. Don't drink alcohol while taking pain medicine.  Pain medicine can make you react more slowly to things. Don't drive or run machinery while taking pain medicine.  Your healthcare provider may tell you to take acetaminophen to help ease your pain. Ask them how much you're supposed to take each day. Acetaminophen or other pain relievers may interact with your prescription medicines or other over-the-counter (OTC) medicines. Some prescription medicines have acetaminophen and other ingredients in them. Using both prescription and OTC acetaminophen for pain can cause you to accidentally overdose. Read the labels on your OTC medicines with care. This will help you to clearly know the list of ingredients, how much to take, and any warnings. It may also help you not take too much acetaminophen. If you have questions or don't understand the information, ask your pharmacist or healthcare provider to explain it to you before you take the OTC medicine.   Managing nausea  Some people have an upset stomach (nausea) after surgery. This is often because of anesthesia, pain, or pain medicine, less movement of food in the stomach, or the stress of surgery. These tips will help you handle nausea and eat healthy foods as you get better. If you were on a special food plan before surgery, ask your healthcare provider if you should follow it while you get better. Check with your provider on how your eating should progress. It may depend on the surgery  you had. These general tips may help:   Don't push yourself to eat. Your body will tell you when to eat and how much.  Start off with clear liquids and soup. They're easier to digest.  Next try semi-solid foods as you feel ready. These include mashed potatoes, applesauce, and gelatin.  Slowly move to solid foods. Don’t eat fatty, rich, or spicy foods at first.  Don't force yourself to have 3 large meals a day. Instead eat smaller amounts more often.  Take pain medicines with a small amount of solid food, such as crackers or toast. This helps prevent nausea.  When to call your healthcare provider  Call your healthcare provider right away if you have any of these:   You still have too much pain, or the pain gets worse, after taking the medicine. The medicine may not be strong enough. Or there may be a complication from the surgery.  You feel too sleepy, dizzy, or groggy. The medicine may be too strong.  Side effects such as nausea or vomiting. Your healthcare provider may advise taking other medicines to .  Skin changes such as rash, itching, or hives. This may mean you have an allergic reaction. Your provider may advise taking other medicines.  The incision looks different (for instance, part of it opens up).  Bleeding or fluid leaking from the incision site, and weren't told to expect that.  Fever of 100.4°F (38°C) or higher, or as directed by your provider.  Call 911  Call 911 right away if you have:   Trouble breathing  Facial swelling    If you have obstructive sleep apnea   You were given anesthesia medicine during surgery to keep you comfortable and free of pain. After surgery, you may have more apnea spells because of this medicine and other medicines you were given. The spells may last longer than normal.    At home:  Keep using the continuous positive airway pressure (CPAP) device when you sleep. Unless your healthcare provider tells you not to, use it when you sleep, day or night. CPAP is a common device  used to treat obstructive sleep apnea.  Talk with your provider before taking any pain medicine, muscle relaxants, or sedatives. Your provider will tell you about the possible dangers of taking these medicines.  Contact your provider if your sleeping changes a lot even when taking medicines as directed.  [employer] last reviewed this educational content on 10/1/2021  © 6961-9506 [employer]. All rights reserved. This information is not intended as a substitute for professional medical care. Always follow your healthcare professional's instructions.

## 2024-05-16 ENCOUNTER — HOSPITAL ENCOUNTER (OUTPATIENT)
Facility: HOSPITAL | Age: 54
Setting detail: HOSPITAL OUTPATIENT SURGERY
Discharge: HOME | End: 2024-05-16
Attending: SURGERY | Admitting: SURGERY

## 2024-05-16 ENCOUNTER — ANESTHESIA (OUTPATIENT)
Dept: SURGERY | Facility: HOSPITAL | Age: 54
End: 2024-05-16

## 2024-05-16 VITALS
WEIGHT: 293 LBS | HEIGHT: 71 IN | BODY MASS INDEX: 41.02 KG/M2 | TEMPERATURE: 98 F | HEART RATE: 76 BPM | RESPIRATION RATE: 18 BRPM | DIASTOLIC BLOOD PRESSURE: 81 MMHG | SYSTOLIC BLOOD PRESSURE: 143 MMHG | OXYGEN SATURATION: 98 %

## 2024-05-16 DIAGNOSIS — R59.1 LYMPHADENOPATHY: ICD-10-CM

## 2024-05-16 LAB
ATRIAL RATE: 85 BPM
B-HCG UR QL: NEGATIVE
GLUCOSE BLDC GLUCOMTR-MCNC: 104 MG/DL (ref 70–99)
P AXIS: 49 DEGREES
P-R INTERVAL: 154 MS
Q-T INTERVAL: 336 MS
QRS DURATION: 82 MS
QTC CALCULATION (BEZET): 399 MS
R AXIS: 0 DEGREES
T AXIS: 76 DEGREES
VENTRICULAR RATE: 85 BPM

## 2024-05-16 PROCEDURE — 07B50ZX EXCISION OF RIGHT AXILLARY LYMPHATIC, OPEN APPROACH, DIAGNOSTIC: ICD-10-PCS | Performed by: SURGERY

## 2024-05-16 PROCEDURE — 38525 BIOPSY/REMOVAL LYMPH NODES: CPT | Performed by: SURGERY

## 2024-05-16 RX ORDER — DEXTROSE MONOHYDRATE 25 G/50ML
50 INJECTION, SOLUTION INTRAVENOUS
Status: DISCONTINUED | OUTPATIENT
Start: 2024-05-16 | End: 2024-05-16

## 2024-05-16 RX ORDER — METOCLOPRAMIDE 10 MG/1
10 TABLET ORAL ONCE
Status: DISCONTINUED | OUTPATIENT
Start: 2024-05-16 | End: 2024-05-16 | Stop reason: HOSPADM

## 2024-05-16 RX ORDER — NICOTINE POLACRILEX 4 MG
30 LOZENGE BUCCAL
Status: DISCONTINUED | OUTPATIENT
Start: 2024-05-16 | End: 2024-05-16

## 2024-05-16 RX ORDER — MORPHINE SULFATE 10 MG/ML
6 INJECTION, SOLUTION INTRAMUSCULAR; INTRAVENOUS EVERY 10 MIN PRN
Status: DISCONTINUED | OUTPATIENT
Start: 2024-05-16 | End: 2024-05-16

## 2024-05-16 RX ORDER — IBUPROFEN 600 MG/1
600 TABLET ORAL EVERY 6 HOURS PRN
Qty: 15 TABLET | Refills: 1 | Status: SHIPPED | OUTPATIENT
Start: 2024-05-16 | End: 2024-05-23

## 2024-05-16 RX ORDER — METOCLOPRAMIDE HYDROCHLORIDE 5 MG/ML
10 INJECTION INTRAMUSCULAR; INTRAVENOUS ONCE
Status: DISCONTINUED | OUTPATIENT
Start: 2024-05-16 | End: 2024-05-16 | Stop reason: HOSPADM

## 2024-05-16 RX ORDER — SODIUM CHLORIDE, SODIUM LACTATE, POTASSIUM CHLORIDE, CALCIUM CHLORIDE 600; 310; 30; 20 MG/100ML; MG/100ML; MG/100ML; MG/100ML
INJECTION, SOLUTION INTRAVENOUS CONTINUOUS
Status: DISCONTINUED | OUTPATIENT
Start: 2024-05-16 | End: 2024-05-16

## 2024-05-16 RX ORDER — ACETAMINOPHEN 500 MG
1000 TABLET ORAL ONCE
Status: COMPLETED | OUTPATIENT
Start: 2024-05-16 | End: 2024-05-16

## 2024-05-16 RX ORDER — NALOXONE HYDROCHLORIDE 0.4 MG/ML
0.08 INJECTION, SOLUTION INTRAMUSCULAR; INTRAVENOUS; SUBCUTANEOUS AS NEEDED
Status: DISCONTINUED | OUTPATIENT
Start: 2024-05-16 | End: 2024-05-16

## 2024-05-16 RX ORDER — HYDROMORPHONE HYDROCHLORIDE 1 MG/ML
0.2 INJECTION, SOLUTION INTRAMUSCULAR; INTRAVENOUS; SUBCUTANEOUS EVERY 5 MIN PRN
Status: DISCONTINUED | OUTPATIENT
Start: 2024-05-16 | End: 2024-05-16

## 2024-05-16 RX ORDER — LIDOCAINE HYDROCHLORIDE 10 MG/ML
INJECTION, SOLUTION EPIDURAL; INFILTRATION; INTRACAUDAL; PERINEURAL AS NEEDED
Status: DISCONTINUED | OUTPATIENT
Start: 2024-05-16 | End: 2024-05-16 | Stop reason: SURG

## 2024-05-16 RX ORDER — HYDROMORPHONE HYDROCHLORIDE 1 MG/ML
0.4 INJECTION, SOLUTION INTRAMUSCULAR; INTRAVENOUS; SUBCUTANEOUS EVERY 5 MIN PRN
Status: DISCONTINUED | OUTPATIENT
Start: 2024-05-16 | End: 2024-05-16

## 2024-05-16 RX ORDER — HYDROMORPHONE HYDROCHLORIDE 1 MG/ML
0.6 INJECTION, SOLUTION INTRAMUSCULAR; INTRAVENOUS; SUBCUTANEOUS EVERY 5 MIN PRN
Status: DISCONTINUED | OUTPATIENT
Start: 2024-05-16 | End: 2024-05-16

## 2024-05-16 RX ORDER — MORPHINE SULFATE 4 MG/ML
4 INJECTION, SOLUTION INTRAMUSCULAR; INTRAVENOUS EVERY 10 MIN PRN
Status: DISCONTINUED | OUTPATIENT
Start: 2024-05-16 | End: 2024-05-16

## 2024-05-16 RX ORDER — NICOTINE POLACRILEX 4 MG
15 LOZENGE BUCCAL
Status: DISCONTINUED | OUTPATIENT
Start: 2024-05-16 | End: 2024-05-16

## 2024-05-16 RX ORDER — TRAMADOL HYDROCHLORIDE 50 MG/1
TABLET ORAL EVERY 4 HOURS PRN
Qty: 15 TABLET | Refills: 0 | Status: SHIPPED | OUTPATIENT
Start: 2024-05-16

## 2024-05-16 RX ORDER — CEFAZOLIN SODIUM IN 0.9 % NACL 3 G/100 ML
3 INTRAVENOUS SOLUTION, PIGGYBACK (ML) INTRAVENOUS ONCE
Status: COMPLETED | OUTPATIENT
Start: 2024-05-16 | End: 2024-05-16

## 2024-05-16 RX ORDER — MORPHINE SULFATE 4 MG/ML
2 INJECTION, SOLUTION INTRAMUSCULAR; INTRAVENOUS EVERY 10 MIN PRN
Status: DISCONTINUED | OUTPATIENT
Start: 2024-05-16 | End: 2024-05-16

## 2024-05-16 RX ORDER — FAMOTIDINE 10 MG/ML
20 INJECTION, SOLUTION INTRAVENOUS ONCE
Status: COMPLETED | OUTPATIENT
Start: 2024-05-16 | End: 2024-05-16

## 2024-05-16 RX ORDER — DEXAMETHASONE SODIUM PHOSPHATE 4 MG/ML
VIAL (ML) INJECTION AS NEEDED
Status: DISCONTINUED | OUTPATIENT
Start: 2024-05-16 | End: 2024-05-16 | Stop reason: SURG

## 2024-05-16 RX ORDER — TRAMADOL HYDROCHLORIDE 50 MG/1
100 TABLET ORAL ONCE
Status: COMPLETED | OUTPATIENT
Start: 2024-05-16 | End: 2024-05-16

## 2024-05-16 RX ORDER — BUPIVACAINE HYDROCHLORIDE AND EPINEPHRINE 2.5; 5 MG/ML; UG/ML
INJECTION, SOLUTION INFILTRATION; PERINEURAL AS NEEDED
Status: DISCONTINUED | OUTPATIENT
Start: 2024-05-16 | End: 2024-05-16 | Stop reason: HOSPADM

## 2024-05-16 RX ORDER — ONDANSETRON 2 MG/ML
INJECTION INTRAMUSCULAR; INTRAVENOUS AS NEEDED
Status: DISCONTINUED | OUTPATIENT
Start: 2024-05-16 | End: 2024-05-16 | Stop reason: SURG

## 2024-05-16 RX ORDER — LIDOCAINE HYDROCHLORIDE 40 MG/ML
SOLUTION TOPICAL AS NEEDED
Status: DISCONTINUED | OUTPATIENT
Start: 2024-05-16 | End: 2024-05-16 | Stop reason: SURG

## 2024-05-16 RX ORDER — FAMOTIDINE 20 MG/1
20 TABLET, FILM COATED ORAL ONCE
Status: COMPLETED | OUTPATIENT
Start: 2024-05-16 | End: 2024-05-16

## 2024-05-16 RX ADMIN — ONDANSETRON 4 MG: 2 INJECTION INTRAMUSCULAR; INTRAVENOUS at 14:03:00

## 2024-05-16 RX ADMIN — CEFAZOLIN SODIUM IN 0.9 % NACL 3 G: 3 G/100 ML INTRAVENOUS SOLUTION, PIGGYBACK (ML) INTRAVENOUS at 14:03:00

## 2024-05-16 RX ADMIN — LIDOCAINE HYDROCHLORIDE 4 ML: 40 SOLUTION TOPICAL at 13:59:00

## 2024-05-16 RX ADMIN — LIDOCAINE HYDROCHLORIDE 50 MG: 10 INJECTION, SOLUTION EPIDURAL; INFILTRATION; INTRACAUDAL; PERINEURAL at 13:54:00

## 2024-05-16 RX ADMIN — DEXAMETHASONE SODIUM PHOSPHATE 4 MG: 4 MG/ML VIAL (ML) INJECTION at 14:03:00

## 2024-05-16 RX ADMIN — SODIUM CHLORIDE, SODIUM LACTATE, POTASSIUM CHLORIDE, CALCIUM CHLORIDE: 600; 310; 30; 20 INJECTION, SOLUTION INTRAVENOUS at 13:54:00

## 2024-05-16 NOTE — ANESTHESIA POSTPROCEDURE EVALUATION
Patient: Megan Almonte    Procedure Summary       Date: 05/16/24 Room / Location: [facility] MAIN OR 08 / [facility] MAIN OR    Anesthesia Start: 1354 Anesthesia Stop: 1433    Procedure: Right axillary lymph node biopsy (Right: Axilla) Diagnosis:       Lymphadenopathy      (Lymphadenopathy [R59.1])    Surgeons: Adrian San MD Anesthesiologist: Russ Leal MD    Anesthesia Type: general ASA Status: 3            Anesthesia Type: general    Vitals Value Taken Time   /78 05/16/24 1431   Temp 97.8 °F (36.6 °C) 05/16/24 1431   Pulse 68 05/16/24 1432   Resp 22 05/16/24 1432   SpO2 93 % 05/16/24 1432   Vitals shown include unfiled device data.    [facility] AN Post Evaluation:   Patient Evaluated in PACU  Patient Participation: complete - patient participated  Level of Consciousness: awake and alert  Pain Management: adequate  Airway Patency:patent  Yes    Cardiovascular Status: acceptable  Respiratory Status: acceptable and nasal cannula  Postoperative Hydration acceptable      Russ Leal MD  5/16/2024 2:33 PM

## 2024-05-16 NOTE — ANESTHESIA PROCEDURE NOTES
Airway  Date/Time: 5/16/2024 1:59 PM  Urgency: elective      General Information and Staff    Patient location during procedure: OR  Anesthesiologist: Russ Leal MD  Performed: anesthesiologist   Performed by: Russ Leal MD  Authorized by: Russ Leal MD      Indications and Patient Condition  Indications for airway management: anesthesia  Sedation level: deep  Preoxygenated: yes  Patient position: sniffing  Mask difficulty assessment: 1 - vent by mask    Final Airway Details  Final airway type: endotracheal airway      Successful airway: ETT  Cuffed: yes   Successful intubation technique: Video laryngoscopy  Facilitating devices/methods: intubating stylet  Endotracheal tube insertion site: oral  Blade type: montague.  Blade size: #4  ETT size (mm): 7.5    Cormack-Lehane Classification: grade I - full view of glottis  Placement verified by: capnometry   Measured from: lips  ETT to lips (cm): 23  Number of attempts at approach: 1    Additional Comments  Atraumatic x1, soft bite block

## 2024-05-16 NOTE — ANESTHESIA PREPROCEDURE EVALUATION
Anesthesia PreOp Note    HPI:     Meagn Almonte is a 53 year old female who presents for preoperative consultation requested by: Adrian San MD    Date of Surgery: 5/16/2024    Procedure(s):  Right axillary lymph node biopsy  Indication: Lymphadenopathy [R59.1]    Relevant Problems   No relevant active problems       NPO:  Last Liquid Consumption Date: 05/15/24  Last Liquid Consumption Time: 2355  Last Solid Consumption Date: 05/15/24  Last Solid Consumption Time: 2100  Last Liquid Consumption Date: 05/15/24          History Review:  There are no problems to display for this patient.      Past Medical History:    Anemia    Back problem    Depression    Diabetes (HCC)    Disorder of thyroid    High blood pressure    High cholesterol    Osteoarthritis    Visual impairment       Past Surgical History:   Procedure Laterality Date    Cholecystectomy      Hip surgery Left     for hip dysplasia    Other surgical history      turbinate reduction        Medications Prior to Admission   Medication Sig Dispense Refill Last Dose    diclofenac 75 MG Oral Tab EC Take 1 tablet (75 mg total) by mouth 2 (two) times daily.   5/15/2024 at 2230    DULoxetine 60 MG Oral Cap DR Particles Take by mouth daily.   5/15/2024 at 2230    baclofen 10 MG Oral Tab Take 1 tablet (10 mg total) by mouth.   5/14/2024    gabapentin 600 MG Oral Tab Take 1 tablet (600 mg total) by mouth as needed.   5/2/2024    metFORMIN  MG Oral Tablet 24 Hr Take 1 tablet (500 mg total) by mouth daily with breakfast.   5/14/2024    pregabalin 75 MG Oral Cap Take 1 capsule (75 mg total) by mouth as needed.   Past Month    traMADol 50 MG Oral Tab Take 1 tablet (50 mg total) by mouth as needed for Pain.   5/15/2024 at 2230    TRULICITY 1.5 MG/0.5ML Subcutaneous Solution Pen-injector ADMINISTER 1.5 MG UNDER THE SKIN EVERY WEEK FOR DIABETES   5/9/2024    Fluticasone Propionate 50 MCG/ACT Nasal Suspension SHAKE LQ AND U 1 SPR IEN QD  6 Past Month     Loratadine-Pseudoephedrine ER 5-120 MG Oral Tablet 12 Hr Take 1 tablet by mouth every 12 (twelve) hours. 60 tablet 3 Past Month    Albuterol Sulfate  (90 Base) MCG/ACT Inhalation Aero Soln INL 2 PFS PO Q 4 H  3 More than a month     Current Facility-Administered Medications Ordered in Epic   Medication Dose Route Frequency Provider Last Rate Last Admin    lactated ringers infusion   Intravenous Continuous Adrian San MD 20 mL/hr at 05/16/24 1159 New Bag at 05/16/24 1159    metoclopramide (Reglan) tab 10 mg  10 mg Oral Once Adrian San MD        Or    metoclopramide (Reglan) 5 mg/mL injection 10 mg  10 mg Intravenous Once Adrian San MD        ceFAZolin (Ancef) 3 g in sodium chloride 0.9% 100mL IVPB premix  3 g Intravenous Once Adrian San MD         No current [facility]-ordered outpatient medications on file.       Allergies   Allergen Reactions    Codeine UNKNOWN       Family History   Problem Relation Age of Onset    Diabetes Mother     Hypertension Mother      Social History     Socioeconomic History    Marital status: Single   Tobacco Use    Smoking status: Never    Smokeless tobacco: Never   Vaping Use    Vaping status: Never Used   Substance and Sexual Activity    Alcohol use: Never    Drug use: Never       Available pre-op labs reviewed.  Lab Results   Component Value Date    WBC 11.5 (H) 05/15/2024    RBC 5.17 05/15/2024    HGB 13.7 05/15/2024    HCT 42.6 05/15/2024    MCV 82.4 05/15/2024    MCH 26.5 05/15/2024    MCHC 32.2 05/15/2024    RDW 15.6 (H) 05/15/2024    .0 05/15/2024    PREGU Negative 05/15/2024    URINEPREG Negative 05/16/2024     Lab Results   Component Value Date     05/15/2024    K 3.7 05/15/2024     05/15/2024    CO2 24.0 05/15/2024    BUN 9 05/15/2024    CREATSERUM 1.03 (H) 05/15/2024     (H) 05/15/2024    CA 9.5 05/15/2024          Vital Signs:  Body mass index is 44.07 kg/m².   height is 1.803 m (5' 11\") and weight is 143.3 kg (316 lb) (abnormal).  Her oral temperature is 97.8 °F (36.6 °C). Her blood pressure is 144/85 and her pulse is 82. Her respiration is 18 and oxygen saturation is 97%.   Vitals:    05/15/24 1124 05/16/24 1121   BP:  144/85   Pulse:  82   Resp:  18   Temp:  97.8 °F (36.6 °C)   TempSrc:  Oral   SpO2:  97%   Weight: (!) 144.2 kg (318 lb) (!) 143.3 kg (316 lb)   Height: 1.803 m (5' 11\") 1.803 m (5' 11\")        Anesthesia Evaluation     Patient summary reviewed and Nursing notes reviewed    No history of anesthetic complications   Airway   Mallampati: II  TM distance: >3 FB  Neck ROM: full  Dental - Dentition appears grossly intact     Pulmonary - negative ROS and normal exam   Cardiovascular - normal exam  (+) hypertension    Neuro/Psych    (+)   depression      GI/Hepatic/Renal - negative ROS     Endo/Other    (+) diabetes mellitus type 2, hypothyroidism  Abdominal                  Anesthesia Plan:   ASA:  3  Plan:   General  Airway:  ETT and Video laryngoscope  Post-op Pain Management: IV analgesics and Local  Plan Comments: I have discussed the anesthetic plan, major risks and alternatives with the patient and answered all questions. The patient desires to proceed with surgery and anesthesia as planned.     Informed Consent Plan and Risks Discussed With:  Patient      I have informed Megan Almonte and/or legal guardian or family member of the nature of the anesthetic plan, benefits, risks including possible dental damage if relevant, major complications, and any alternative forms of anesthetic management.   All of the patient's questions were answered to the best of my ability. The patient desires the anesthetic management as planned.  Russ Leal MD  5/16/2024 12:58 PM  Present on Admission:  **None**

## 2024-05-16 NOTE — INTERVAL H&P NOTE
Pre-op Diagnosis: Lymphadenopathy [R59.1]    The above referenced H&P was reviewed by Adrian San MD on 5/16/2024, the patient was examined and no significant changes have occurred in the patient's condition since the H&P was performed.  I discussed with the patient and/or legal representative the potential benefits, risks and side effects of this procedure; the likelihood of the patient achieving goals; and potential problems that might occur during recuperation.  I discussed reasonable alternatives to the procedure, including risks, benefits and side effects related to the alternatives and risks related to not receiving this procedure.  We will proceed with procedure as planned.

## 2024-05-17 LAB
CD10 CELLS NFR SPEC: <1 %
CD10/CD19: <1 %
CD19 CELLS NFR SPEC: 59 %
CD19+/CD200+: 27 %
CD2 CELLS NFR SPEC: 39 %
CD20 CELLS NFR SPEC: 59 %
CD200 CELLS: 30 %
CD3 CELLS NFR SPEC: 37 %
CD3+/TCRGD+: 1 %
CD3+CD4+ CELLS NFR SPEC: 31 %
CD3+CD4+ CELLS/CD3+CD8+ CLL SPEC: 7.8
CD3+CD8+ CELLS NFR SPEC: 4 %
CD3-/CD56+: 1 %
CD34 CELLS NFR SPEC: <1 %
CD38 CELLS NFR SPEC: <1 %
CD38+/CD19+: <1 %
CD45 CELLS NFR SPEC: 100 %
CD5 CELLS NFR SPEC: 42 %
CD5/CD19 CELLS: 2 %
CD7 CELLS NFR SPEC: 36 %
CELL SURF KAPPA/LAMBDA RATIO: 1.3
CELL SURF LAMBDA LIGHT CHAIN: 26 %
CELL SURFACE KAPPA LIGHT CHAIN: 34 %
TCR G-D CELLS NFR SPEC: 1 %

## 2024-05-17 NOTE — OPERATIVE REPORT
[facility]    PATIENT'S NAME: RUBY EUCEDA   ATTENDING PHYSICIAN: Adrian San MD   OPERATING PHYSICIAN: Adrian San MD   PATIENT ACCOUNT#:   [id number]    LOCATION:  [facility] 7 [facility] 10  MEDICAL RECORD #:   [id number]       YOB: 1970  ADMISSION DATE:       05/16/2024      OPERATION DATE:  05/16/2024    OPERATIVE REPORT    PREOPERATIVE DIAGNOSIS:  Lymphadenopathy.  POSTOPERATIVE DIAGNOSIS:  Lymphadenopathy.  PROCEDURE:  Excision, deep right axillary lymph node biopsy.    ASSISTANT:  YUSUF Kasepr    ESTIMATED BLOOD LOSS:  5 mL.    COMPLICATIONS:  None.    ANESTHESIA:  General.    DISPOSITION:  To Recovery, tolerated well.    INDICATIONS:  Patient is an obese 53-year-old with lymphadenopathy, both in the axilla as well as intra-abdominal.  She is undergoing neurologic workup as well at [facility].  Detailed informed consent obtained.    OPERATIVE TECHNIQUE:  She is taken to surgery.  She is prepped and draped in usual sterile fashion.  Local is infiltrated.  A 4 cm incision made along the posterior hairline.  This is carried down through the subcutaneous tissue.  The axillary fat pad is identified.  There is a 3 cm lymph node identified using LigaSure to carefully dissect it, completely excised.  Portion sent for culture, AFB; the second sent for lymphoma protocol.  Hemostasis maintained.  The wound irrigated, closed in multiple layers.  Dermabond applied.  She tolerated this well.  Marcaine was infiltrated for local block.    Dictated By Adrian San MD  d: 05/16/2024 14:21:32  t: 05/16/2024 18:58:32  Job [id number]  GL/    cc: Dr. Latricia Chang

## 2024-05-28 ENCOUNTER — TELEPHONE (OUTPATIENT)
Dept: SURGERY | Facility: CLINIC | Age: 54
End: 2024-05-28

## 2024-05-28 NOTE — TELEPHONE ENCOUNTER
Phone call to the patient confirmed appt.  Patient expressed understanding.   Deborah   
Pt called.  Surgery 5-16-24.  Pt scheduled first available appointment in [place] on 6-12-24.  Can pt be seen sooner.   Please call   
See mychart. Scheduled PO apt with JENNIFER Espinoza for 6/5/24 @ 2:30 in [facility], [id number], yellow parking lot. Please confirm.   
No

## 2024-06-05 ENCOUNTER — OFFICE VISIT (OUTPATIENT)
Dept: SURGERY | Facility: CLINIC | Age: 54
End: 2024-06-05
Payer: MEDICAID

## 2024-06-05 DIAGNOSIS — Z48.89 ENCOUNTER FOR POSTOPERATIVE CARE: Primary | ICD-10-CM

## 2024-06-05 PROCEDURE — 99024 POSTOP FOLLOW-UP VISIT: CPT

## 2024-06-05 NOTE — PROGRESS NOTES
S:  Megan Almonte is a 53 year old female sp lymph node excision with Dr. San  Doing well, no fevers, no chills, tolerating a general diet, generally normal bowel movements, no calf tenderness nor lower extremity edema, no shortness of breath, no chest pain.       O:  LMP 05/06/2024 (Exact Date)   GEN:  No acute distress  L: nonlabored respirations  H: reg rate  Abd:  Soft, NT,ND.  Skin: Incision C D I, no eryth  Extr: No edema, no calf tenderness    Path:  Reviewed w pt    Assessment   1. Encounter for postoperative care        Doing well post op  Continue to keep incision clean and dry.  Maintain a healthy diet.  Maintain good hydration.  F/u prn.         Bear Tong PA-C

## 2025-06-30 ENCOUNTER — OFFICE VISIT (OUTPATIENT)
Dept: OTOLARYNGOLOGY | Facility: CLINIC | Age: 55
End: 2025-06-30

## 2025-06-30 DIAGNOSIS — E04.1 THYROID NODULE: Primary | ICD-10-CM

## 2025-06-30 DIAGNOSIS — H92.03 OTALGIA OF BOTH EARS: ICD-10-CM

## 2025-06-30 PROCEDURE — 99213 OFFICE O/P EST LOW 20 MIN: CPT | Performed by: OTOLARYNGOLOGY

## 2025-06-30 RX ORDER — PREGABALIN 75 MG/1
75 CAPSULE ORAL
COMMUNITY
Start: 2024-06-27

## 2025-06-30 RX ORDER — AMLODIPINE BESYLATE 5 MG/1
5 TABLET ORAL DAILY
COMMUNITY
Start: 2025-03-24

## 2025-06-30 RX ORDER — FLUCONAZOLE 150 MG/1
TABLET ORAL
COMMUNITY

## 2025-06-30 RX ORDER — CLOTRIMAZOLE 1 %
CREAM (GRAM) TOPICAL
COMMUNITY

## 2025-06-30 RX ORDER — ATORVASTATIN CALCIUM 20 MG/1
TABLET, FILM COATED ORAL
COMMUNITY

## 2025-06-30 RX ORDER — ACETAMINOPHEN 500 MG
TABLET ORAL
COMMUNITY

## 2025-06-30 RX ORDER — DICLOFENAC SODIUM 75 MG/1
75 TABLET, DELAYED RELEASE ORAL 2 TIMES DAILY PRN
COMMUNITY
Start: 2025-06-23

## 2025-06-30 RX ORDER — PANTOPRAZOLE SODIUM 40 MG/1
TABLET, DELAYED RELEASE ORAL
COMMUNITY

## 2025-06-30 RX ORDER — CYANOCOBALAMIN 1000 UG/ML
INJECTION, SOLUTION INTRAMUSCULAR; SUBCUTANEOUS
COMMUNITY
Start: 2025-06-25

## 2025-06-30 RX ORDER — HYOSCYAMINE SULFATE 0.12 MG/1
TABLET SUBLINGUAL
COMMUNITY

## 2025-06-30 RX ORDER — HYDROCORTISONE 25 MG/G
OINTMENT TOPICAL
COMMUNITY

## 2025-06-30 RX ORDER — LOSARTAN POTASSIUM 25 MG/1
25 TABLET ORAL DAILY
COMMUNITY
Start: 2025-03-23

## 2025-06-30 RX ORDER — HYDROXYCHLOROQUINE SULFATE 200 MG/1
200 TABLET, FILM COATED ORAL DAILY
COMMUNITY
Start: 2025-01-30

## 2025-06-30 NOTE — PROGRESS NOTES
Megan Almonte is a 54 year old female.    Chief Complaint   Patient presents with    Follow - Up     Patient is here due to thyroid nodule follow up     Ear Problem     Patient reports ear pain        HISTORY OF PRESENT ILLNESS  She presents a long history of cough.  She had a chest x-ray performed due to this cough which demonstrated a deviated trachea to the right as well as thyroid nodularity.  She underwent a subsequent ultrasound which demonstrated multinodular goiter with initial reading of 0.74 cm nodule on the right with recommendation of repeat ultrasound in several months.  On the addendum was made saying that this nodule appeared suspicious due to its lack of unclear margins and a recommendation for needle biopsy was made by the radiologist.  She is here for further evaluation and treatment.  She does complain of her chronic cough and states that she feels that there is something caught in the back of her throat neck she has tenderness and discomfort when she touches her mid neck at the level of the larynx.  Using fluticasone on a as needed basis but none recently and has been using albuterol for presumed bronchial origin of her cough.  Originally told that she had bronchitis with chest x-ray to rule out any pulmonary issues.  Allergies?  She does note that she is having difficulty breathing through nose.  She has a previous history of turbinate reduction many years ago but states that she is been told that her turbinates are enlarged again.  Unclear if she has a deviated septum.     12/6/19 she is here to go over the results of her needle biopsy.  Previous ultrasound demonstrated a subcentimeter nodule which was read as being suspicious.  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  She is here to discuss further management regarding this finding.  In addition she does complain of sore throat sensation of something being  caught in the back of her throat at all times.  Worried that she may have some type of malignant process in her throat.  She denies reflux but does admit to some postnasal discharge and nasal congestion.  No other signs, symptoms or complaints with no associated voice changes ear pain or difficulty swallowing food.     1/10/20 last visit she was started on Singulair Loratadine-D as well as Astelin nasal spray and now is having thicker nasal mucus.  She did have some improvement in her symptoms initially but now things have worsened significantly over the past week or 2.  Acute sinus infection?  She feels like there is something stuck in the back of her throat at all times which is very uncomfortable primarily on the left side.  She does have a history of an enlarged thyroid.  No tobacco use.  Some mucopurulence noted through her nose and mouth recently.  No other signs, symptoms or complaints     4/22/24 I last saw her 4 years ago for thyroid nodules.  Had a 0.74 cm nodule on the right which was biopsied and shown to be a follicular nodule.  Now presents with a recent ultrasound performed at [facility] demonstrating multiple nodules bilaterally with the largest being 1.3 cm on the left and rated either a TR 2 or 3.  The study does not specify what T RADS level of this nodule was but no indication for biopsy at this time.  Asymptomatic.  Little concerned because she had an aunt who underwent surgery for some type of thyroid problem had it removed but they are not sure if she had cancer because she has not told anybody as of yet.  No other signs, symptoms or complaints.  Sent by Dr. Chang for my opinion regarding her thyroid issues.      6/30/25 Long history of TMJ issues.  No longer using her bite guard anymore.  This about 3 years old.  Notes some decreased hearing on the left side as well.  Has a history of 1.3 cm left-sided nodule of the thyroid.  Here for further evaluation management.  Has not had  any recent ultrasounds of her thyroid the last 1 being in April 2024.  Currently euthyroid.      Social Hx on file[1]    Family History[2]    Past Medical History[3]    Past Surgical History[4]      REVIEW OF SYSTEMS    System Neg/Pos Details   Constitutional Negative Fatigue, fever and weight loss.   ENMT Negative Drooling.   Eyes Negative Blurred vision and vision changes.   Respiratory Negative Dyspnea and wheezing.   Cardio Negative Chest pain, irregular heartbeat/palpitations and syncope.   GI Negative Abdominal pain and diarrhea.   Endocrine Negative Cold intolerance and heat intolerance.   Neuro Negative Tremors.   Psych Negative Anxiety and depression.   Integumentary Negative Frequent skin infections, pigment change and rash.   Hema/Lymph Negative Easy bleeding and easy bruising.           PHYSICAL EXAM    There were no vitals taken for this visit.       Constitutional Normal Overall appearance - Normal.   Psychiatric Normal Orientation - Oriented to time, place, person & situation. Appropriate mood and affect.   Neck Exam Normal Inspection - Normal. Palpation - Normal. Parotid gland - Normal. Thyroid gland - Normal.     Eyes Normal Conjunctiva - Right: Normal, Left: Normal. Pupil - Right: Normal, Left: Normal. Fundus - Right: Normal, Left: Normal.   Neurological Normal Memory - Normal. Cranial nerves - Cranial nerves II through XII grossly intact.   Head/Face Normal Facial features - Normal. Eyebrows - Normal. Skull - Normal.   TMJ  Tender to palpation bilaterally   Nasopharynx Normal External nose - Normal. Lips/teeth/gums - Normal. Tonsils - Normal. Oropharynx - Normal.   Ears Normal Inspection - Right: Normal, Left: Normal. Canal - Right: Normal, Left: Normal. TM - Right: Normal, Left: Normal.   Skin Normal Inspection - Normal.        Lymph Detail Normal Submental. Submandibular. Anterior cervical. Posterior cervical. Supraclavicular.        Nose/Mouth/Throat Normal External nose - Normal.  Lips/teeth/gums - Normal. Tonsils - Normal. Oropharynx - Normal.   Nose/Mouth/Throat Normal Nares - Right: Normal Left: Normal. Septum -Normal  Turbinates - Right: Normal, Left: Normal.     Medications - Current[5]  ASSESSMENT AND PLAN    1. Thyroid nodule  - US THYROID (CPT=76536); Future    2. Otalgia of both ears  Ear pain appears to be musculoskeletal.  I did talk to her about talking to her dentist about whether or not she requires a new bite guard.  Last 1 is about 3 years old and she has not worn it for quite some time.  Warm heat soft diet she politely declined any muscle relaxants and anti-inflammatories at this time.  Use ibuprofen at least and chew on both sides of the mouth.  With respect to the thyroid nodule we will go ahead and order a repeat ultrasound of the thyroid she will perform that here at the [facility] facility.  We will call her with the results of her study.        This note was prepared using Dragon Medical voice recognition dictation software. As a result errors may occur. When identified these errors have been corrected. While every attempt is made to correct errors during dictation discrepancies may still exist    Osbaldo Lance MD    6/30/2025    3:45 PM         [1]   Social History  Socioeconomic History    Marital status: Single   Tobacco Use    Smoking status: Never    Smokeless tobacco: Never   Vaping Use    Vaping status: Never Used   Substance and Sexual Activity    Alcohol use: Never    Drug use: Never   [2]   Family History  Problem Relation Age of Onset    Diabetes Mother     Hypertension Mother    [3]   Past Medical History:   Anemia    Back problem    Depression    Diabetes (HCC)    Disorder of thyroid    High blood pressure    High cholesterol    Osteoarthritis    Visual impairment   [4]   Past Surgical History:  Procedure Laterality Date    Cholecystectomy      Hip surgery Left     for hip dysplasia    Other surgical history      turbinate reduction      Other surgical history  05/16/2024    Right axillary lymph node biopsy   [5]   Current Outpatient Medications:     acetaminophen 500 MG Oral Tab, , Disp: , Rfl:     amLODIPine 5 MG Oral Tab, Take 1 tablet (5 mg total) by mouth daily., Disp: , Rfl:     atorvastatin 20 MG Oral Tab, TAKE 1 TABLET BY MOUTH EVERY DAY AT BEDTIME FOR HIGH CHOLESTEROL, Disp: , Rfl:     clotrimazole 1 % External Cream, , Disp: , Rfl:     cyanocobalamin 1000 MCG/ML Injection Solution, , Disp: , Rfl:     diclofenac 75 MG Oral Tab EC, Take 1 tablet (75 mg total) by mouth 2 (two) times daily as needed., Disp: , Rfl:     fluconazole 150 MG Oral Tab, , Disp: , Rfl:     hydroxychloroquine 200 MG Oral Tab, Take 1 tablet (200 mg total) by mouth daily., Disp: , Rfl:     hydrocortisone 2.5 % External Ointment, , Disp: , Rfl:     hyoscyamine 0.125 MG Sublingual SL Tab, , Disp: , Rfl:     losartan 25 MG Oral Tab, Take 1 tablet (25 mg total) by mouth daily., Disp: , Rfl:     pantoprazole 40 MG Oral Tab EC, , Disp: , Rfl:     pregabalin 75 MG Oral Cap, Take 1 capsule (75 mg total) by mouth., Disp: , Rfl:     traMADol 50 MG Oral Tab, Take 1-2 tablets ( mg total) by mouth every 4 (four) hours as needed for Pain., Disp: 15 tablet, Rfl: 0    DULoxetine 60 MG Oral Cap DR Particles, Take by mouth daily., Disp: , Rfl:     gabapentin 600 MG Oral Tab, Take 1 tablet (600 mg total) by mouth as needed., Disp: , Rfl:     metFORMIN  MG Oral Tablet 24 Hr, Take 1 tablet (500 mg total) by mouth daily with breakfast., Disp: , Rfl:     TRULICITY 1.5 MG/0.5ML Subcutaneous Solution Pen-injector, ADMINISTER 1.5 MG UNDER THE SKIN EVERY WEEK FOR DIABETES, Disp: , Rfl:     Albuterol Sulfate  (90 Base) MCG/ACT Inhalation Aero Soln, INL 2 PFS PO Q 4 H, Disp: , Rfl: 3    Fluticasone Propionate 50 MCG/ACT Nasal Suspension, SHAKE LQ AND U 1 SPR IEN QD, Disp: , Rfl: 6    Loratadine-Pseudoephedrine ER 5-120 MG Oral Tablet 12 Hr, Take 1 tablet by mouth every 12 (twelve)  hours., Disp: 60 tablet, Rfl: 3

## 2025-07-04 NOTE — TELEPHONE ENCOUNTER
CHARLEY and Left a message on the voice mail at 5:45 on 5-14-24.   Deborah   
Patient requesting to reschedule her 5/16 to location at [facility]. Please call at [phone],thanks.  
Received call from the patient and she elects to have surgery tomorrow as planned.  All questions answered.   SALLY   
4 = No assist / stand by assistance

## (undated) DEVICE — GAMMEX® PI HYBRID SIZE 7, STERILE POWDER-FREE SURGICAL GLOVE, POLYISOPRENE AND NEOPRENE BLEND: Brand: GAMMEX

## (undated) DEVICE — E-Z CLEAN, NON-STICK, PTFE COATED, ELECTROSURGICAL BLADE ELECTRODE, 2.75 INCH (7 CM): Brand: MEGADYNE

## (undated) DEVICE — COVER PRB 1X11.8IN ENDOCAVITY CLR E BND

## (undated) DEVICE — SUT CHRM GUT 2-0 27IN SH ABSRB UD 26MM 1/2

## (undated) DEVICE — CONTAINER,SPECIMEN,OR STERILE,4OZ: Brand: MEDLINE

## (undated) DEVICE — SUT CHRM GUT 2-0 27IN CT ABSRB UD 40MM 1/2

## (undated) DEVICE — DRAPE,T,LAPARO,TRANS,STERILE: Brand: MEDLINE

## (undated) DEVICE — SOLUTION IRRIG 1000ML 0.9% NACL USP BTL

## (undated) DEVICE — MINOR GENERAL: Brand: MEDLINE INDUSTRIES, INC.

## (undated) DEVICE — SUT MCRYL 3-0 18IN PS-2 ABSRB UD 19MM 3/8 CIR

## (undated) DEVICE — ADHESIVE SKIN TOP FOR WND CLSR DERMBND ADV

## (undated) DEVICE — LIGASURE EXACT DISSECTOR: Brand: LIGASURE

## (undated) NOTE — LETTER
Vani Zuniga, [address]  [facility], [address] [facility]       11/04/19        Patient: Regina Clifton   YOB: 1970   Date of Visit: 11/4/2019       Dear  Dr. Nithin Lawton MD,      Thank you for referring Regina Clifton to my practice. Document electronically generated by:  Ajit Ortiz.  Maricruz Mcintosh

## (undated) NOTE — LETTER
[address], [place], [place]     AUTHORIZATION FOR SURGICAL OPERATION OR PROCEDURE    I hereby authorize Dr. En Sotelo, my Physician(s) and whomever may be designated as the doctor's Assistant, to perform the followin 4. I consent to the photographing of procedure(s) to be performed for the purposes of advancing medicine, science and/or education, provided my identity is not revealed.  If the procedure has been videotaped, the physician/surgeon will obtain the original v (Witness signature)                                                                                                  (Date)                                (Time)  STATEMENT OF PHYSICIAN My signature below affirms that prior to the time of the procedure;  I

## (undated) NOTE — LETTER
No referring provider defined for this encounter.       04/22/24        Patient: Megan Almonte   YOB: 1970   Date of Visit: 4/22/2024       Dear  Dr. Charlene MD,      Thank you for referring Megan Almonte to my practice.  Please find my assessment and plan below.    ASSESSMENT AND PLAN    1. Thyroid nodule  Unchanged right-sided nodule.  Previous biopsy demonstrated follicular nodule.  Now with a 1.3 cm either TR 2 or TR 3 lesion.  Ultrasound result does not state what T RADS score and has.  I did recommend repeating an ultrasound in 1 year and at this time no indication for biopsy.                   Sincerely,   Osbaldo aLnce MD   [facility], [address] [id number]    Document electronically generated by:  Osbaldo Lance MD